# Patient Record
Sex: FEMALE | Race: WHITE
[De-identification: names, ages, dates, MRNs, and addresses within clinical notes are randomized per-mention and may not be internally consistent; named-entity substitution may affect disease eponyms.]

---

## 2017-06-16 PROBLEM — Z00.00 ENCOUNTER FOR PREVENTIVE HEALTH EXAMINATION: Status: ACTIVE | Noted: 2017-06-16

## 2017-07-07 ENCOUNTER — APPOINTMENT (OUTPATIENT)
Dept: VASCULAR SURGERY | Facility: CLINIC | Age: 37
End: 2017-07-07

## 2017-07-21 ENCOUNTER — APPOINTMENT (OUTPATIENT)
Dept: VASCULAR SURGERY | Facility: CLINIC | Age: 37
End: 2017-07-21

## 2017-07-21 VITALS
WEIGHT: 153 LBS | DIASTOLIC BLOOD PRESSURE: 68 MMHG | BODY MASS INDEX: 24.01 KG/M2 | HEIGHT: 67 IN | SYSTOLIC BLOOD PRESSURE: 124 MMHG

## 2017-07-21 DIAGNOSIS — I87.9 DISORDER OF VEIN, UNSPECIFIED: ICD-10-CM

## 2017-07-21 DIAGNOSIS — I78.1 NEVUS, NON-NEOPLASTIC: ICD-10-CM

## 2017-08-08 ENCOUNTER — APPOINTMENT (OUTPATIENT)
Dept: BREAST CENTER | Facility: CLINIC | Age: 37
End: 2017-08-08
Payer: COMMERCIAL

## 2017-08-08 VITALS
BODY MASS INDEX: 24.01 KG/M2 | SYSTOLIC BLOOD PRESSURE: 122 MMHG | DIASTOLIC BLOOD PRESSURE: 78 MMHG | HEIGHT: 67 IN | WEIGHT: 153 LBS

## 2017-08-08 DIAGNOSIS — Z78.9 OTHER SPECIFIED HEALTH STATUS: ICD-10-CM

## 2017-08-08 PROCEDURE — 99212 OFFICE O/P EST SF 10 MIN: CPT

## 2018-08-14 ENCOUNTER — APPOINTMENT (OUTPATIENT)
Dept: BREAST CENTER | Facility: CLINIC | Age: 38
End: 2018-08-14
Payer: COMMERCIAL

## 2018-08-14 VITALS
WEIGHT: 153 LBS | DIASTOLIC BLOOD PRESSURE: 76 MMHG | BODY MASS INDEX: 24.01 KG/M2 | SYSTOLIC BLOOD PRESSURE: 126 MMHG | HEIGHT: 67 IN

## 2018-08-14 PROCEDURE — 99212 OFFICE O/P EST SF 10 MIN: CPT

## 2019-07-23 ENCOUNTER — APPOINTMENT (OUTPATIENT)
Dept: VASCULAR SURGERY | Facility: CLINIC | Age: 39
End: 2019-07-23
Payer: COMMERCIAL

## 2019-07-23 ENCOUNTER — APPOINTMENT (OUTPATIENT)
Dept: BREAST CENTER | Facility: CLINIC | Age: 39
End: 2019-07-23
Payer: COMMERCIAL

## 2019-07-23 PROCEDURE — 93971 EXTREMITY STUDY: CPT

## 2019-07-23 PROCEDURE — 99213 OFFICE O/P EST LOW 20 MIN: CPT

## 2019-07-23 NOTE — HISTORY OF PRESENT ILLNESS
[FreeTextEntry1] : 40 y/o female presents with right leg pain, and swelling for 2 weeks, concerned about DVT.

## 2019-07-23 NOTE — DATA REVIEWED
[FreeTextEntry1] : I performed a venous duplex which was medically necessary to evaluate for DVT. It showed no evidence of DVT in the right lower extremity.\par

## 2019-07-23 NOTE — ASSESSMENT
[FreeTextEntry1] : 38 y/o female presents with right leg pain, and swelling for 2 weeks, concerned about DVT. I performed a venous duplex which was medically necessary to evaluate for DVT. It showed no evidence of DVT in the right lower extremity. She has palpable pedal pulses on exam and no discoloration or wounds. She can follow up as needed.\par No vascular etiology to account for symptoms and advised follow up with PMD for further workup for leg pain.\par

## 2019-08-06 ENCOUNTER — APPOINTMENT (OUTPATIENT)
Dept: BREAST CENTER | Facility: CLINIC | Age: 39
End: 2019-08-06
Payer: COMMERCIAL

## 2019-08-06 VITALS
WEIGHT: 150 LBS | TEMPERATURE: 98.2 F | BODY MASS INDEX: 23.54 KG/M2 | DIASTOLIC BLOOD PRESSURE: 80 MMHG | SYSTOLIC BLOOD PRESSURE: 118 MMHG | HEIGHT: 67 IN

## 2019-08-06 DIAGNOSIS — Z80.3 FAMILY HISTORY OF MALIGNANT NEOPLASM OF BREAST: ICD-10-CM

## 2019-08-06 PROCEDURE — 99212 OFFICE O/P EST SF 10 MIN: CPT

## 2019-08-06 NOTE — ASSESSMENT
[FreeTextEntry1] : 39 year old female with extremely dense breasts.  She has no prior complaints related to the breasts and no history of breast biopsy or breast surgery.  Her family history is significant for her paternal grandmother with breast cancer.  Her Meseret Model risk assessment for breast cancer is 8.8% in her lifetime.  \par \par A bilateral screening mammogram was performed in July.  his demonstrated extremely dense breasts with no significant masses, calcifications or other findings seen in either breast.  Bilateral screening ultrasound was also performed at that time.  There are no suspicious abnormalities seen by ultrasound in either breast.\par \par She is here for evaluation of these findings.  At this time, these findings do not present the need for surgical intervention.  She has a benign clinical breast examination and no current complaints related to the breasts. For now, she will need a bilateral screening mammogram and ultrasound for July 2020, with subsequent follow up clinical breast examination.  I spent a total of 10 minutes of face to face time with this patient, greater than 50% of which was spent in counseling and/or coordination of care.  All of her questions were appropriately answered.\par

## 2019-08-06 NOTE — PAST MEDICAL HISTORY
[Menstruating] : The patient is menstruating [Menarche Age ____] : age at menarche was [unfilled] [Regular Cycle Intervals] : have been regular [Total Preg ___] : G[unfilled] [Live Births ___] : P[unfilled]  [Age At Live Birth ___] : Age at live birth: [unfilled] [FreeTextEntry5] : D&C, myomectomy. [FreeTextEntry7] : none [FreeTextEntry6] : clomid for 6-9 months. [FreeTextEntry8] : none

## 2019-08-06 NOTE — HISTORY OF PRESENT ILLNESS
[FreeTextEntry1] : Patient with extremely dense breasts.\par No prior complaints related to the breasts.\par \par Her family history is significant for her paternal grandmother with breast cancer.  \par Her Meseret Model risk assessment is:\par 0.2 % in five years\par 8.8 % in her lifetime

## 2019-08-06 NOTE — PHYSICAL EXAM
[No Supraclavicular Adenopathy] : no supraclavicular adenopathy [Examined in the supine and seated position] : examined in the supine and seated position [Symmetrical] : symmetrical [No dominant masses] : no dominant masses in right breast  [No dominant masses] : no dominant masses left breast [No Nipple Retraction] : no left nipple retraction [Breast Mass Right Breast ___cm] : no masses [No Nipple Discharge] : no left nipple discharge [Breast Mass Left Breast ___cm] : no masses [Breast Nipple Inversion] : nipples not inverted [Breast Nipple Retraction] : nipples not retracted [Breast Nipple Fissures] : nipples not fissured [Breast Nipple Flattening] : nipples not flattened [Breast Abnormal Lactation (Galactorrhea)] : no galactorrhea [Breast Abnormal Secretion Bloody Fluid] : no bloody discharge [Breast Abnormal Secretion Serous Fluid] : no serous discharge [Breast Abnormal Secretion Opalescent Fluid] : no milky discharge [No Axillary Lymphadenopathy] : no left axillary lymphadenopathy [No Edema] : no edema [Full ROM] : full range of motion [No Swelling] : no swelling [No Rashes] : no rashes [No Ulceration] : no ulceration

## 2019-08-06 NOTE — DATA REVIEWED
[FreeTextEntry1] : Study Date:   15 Jul 2019 08:51 \par Referring Phys.:  ANGEL VELOZ Performing Location:   [facility]  \par EXAM:  MAMMO TOMOSYNTHESIS SCREENING BILATERAL \par OVERALL IMPRESSION: OVERALL BI-RADS 1: NEGATIVE\par There is no mammographic or sonographic evidence of malignancy.\par A 1 year screening mammogram (accession [id number]), A 1 year screening mammogram and ultrasound (accession [id number]) of both breast(s) is recommended. The patient will be sent a normal letter.\par MAMMO TOMOSYNTHESIS SCREENING BILATERAL, US BREAST COMPLETE BILATERAL\par Clinical Breast Exam: Patient does report clinical breast exam in the last year.\par Clinical Indication: Routine screening. Family history of paternal grandmother with breast cancer. (accession [id number]), Clinical Indication: Bilateral screening ultrasound. (accession [id number])\par Compared to: 07/12/2018 and 07/13/2015 (accession [id number]), Compared to: 11/30/2018 US BREAST and 07/13/2015 US BREAST COMPLETE BILATERAL (accession [id number])\par MAMMOGRAM:\par Tomosynthesis and 2D imaging of the breast(s) were performed. Current study was also evaluated with a computer aided detection (CAD) system.\par Breast density: Extremely dense, which lowers the sensitivity of mammography.\par No significant masses, calcifications, or other findings are seen in either breast.\par Mammo BI-RADS 1: NEGATIVE\par \par US BREAST COMPLETE BILATERAL\par Ultrasound evaluation was performed including examination of all four quadrants of the breast(s) and the retroareolar region(s).\par No suspicious abnormalities were seen sonographically in either breast.\par Ultrasound BI-RADS 1: NEGATIVE\par Electronic Signature: I personally reviewed the images and agree with this report. Final Report: Dictated by and Signed by Attending Kelley Balke MD 7/15/2019 5:06 PM\par OVERALL IMPRESSION: OVERALL BI-RADS 1: NEGATIVE\par There is no mammographic or sonographic evidence of malignancy.\par A 1 year screening mammogram (accession [id number]), A 1 year screening mammogram and ultrasound (accession [id number]) of both breast(s) is recommended. The patient will be sent a normal letter.\par

## 2020-08-05 ENCOUNTER — APPOINTMENT (OUTPATIENT)
Dept: BREAST CENTER | Facility: CLINIC | Age: 40
End: 2020-08-05
Payer: COMMERCIAL

## 2020-08-05 VITALS
WEIGHT: 150 LBS | BODY MASS INDEX: 23.54 KG/M2 | SYSTOLIC BLOOD PRESSURE: 116 MMHG | TEMPERATURE: 97.8 F | DIASTOLIC BLOOD PRESSURE: 74 MMHG | HEIGHT: 67 IN

## 2020-08-05 PROCEDURE — 99213 OFFICE O/P EST LOW 20 MIN: CPT

## 2020-08-05 NOTE — ASSESSMENT
[FreeTextEntry1] : ZOILA is a boogie 40 year old patient who presented today in follow up for a history of fibrocystic breast changes\par She has been doing well with no new breast related complaints. \par Imaging was done recently which revealed no mammographic or sonographic evidence of malignancy, as detailed above. \par Physical exam was unrevealing today.\par \par Imaging with a bilateral screening mammogram and sonogram will be due in July 2021, and that will be scheduled today. \par She will return for follow-up and clinical breast exam in one year.\par \par I spent a total of 15 minutes of face to face time with this patient, greater than 50% of which was spent in counseling and/or coordination of care.\par All of her questions were appropriately answered.\par She knows to call with any concerns.\par \par \par

## 2020-08-05 NOTE — HISTORY OF PRESENT ILLNESS
[FreeTextEntry1] : Patient with FHx breast cancer - paternal grandmother, age unknown.\par \par Her Meseret Model risk assessment: \par 0.2 % in five years \par 8.8 % in her lifetime\par \par ZOILA ESTRADA is a 40 year old female patient who presents today in follow up for history of fibrocystic breast changes.\par Since her last visit, she has no new breast related complaints. \par Imaging was done on 07/22/2020, which revealed no mammographic or sonographic evidence of malignancy.\par \par She presents today for evaluation and imaging review.

## 2020-08-05 NOTE — DATA REVIEWED
[FreeTextEntry1] : Patient Name:  ZOILA ESTRADA	Patient ID:  [id number]\par Patient :   1980	Gender:   Female\par Accession Number:   [id number]	Study Date:   2020 11:10\par Referring Phys.:  ROLO MAE	Performing Location:   [facility]\par EXAM:  MAMMO TOMOSYNTHESIS SCREENING BILATERAL\par \par \par OVERALL IMPRESSION: OVERALL BI-RADS 2: BENIGN\par \par There is no mammographic or sonographic evidence of malignancy.\par \par A 1 year screening mammogram and ultrasound of both breast(s) is recommended. The patient will be sent a normal letter.\par \par MAMMO TOMOSYNTHESIS SCREENING BILATERAL, US BREAST COMPLETE BILATERAL\par \par Clinical Breast Exam: Patient does report clinical breast exam in the last year.\par \par Clinical Indication: Routine screening. Family history of paternal grandmother with breast cancer. (accession [id number]), Clinical Indication: Bilateral screening ultrasound. (accession [id number])\par \par Compared to: 07/15/2019, 2018, and 2015 (accession [id number]), Compared to: 07/15/2019 US BREAST, 2018 US BREAST, and 2015 US BREAST COMPLETE BILATERAL (accession [id number])\par \par MAMMOGRAM:\par \par Tomosynthesis and 2D imaging of the breast(s) were performed. Current study was also evaluated with a computer aided detection (CAD) system.\par \par Breast density: Extremely dense, which lowers the sensitivity of mammography.\par \par No significant masses, calcifications, or other findings are seen in either breast.\par \par Mammo BI-RADS 1: NEGATIVE\par \par \par US BREAST COMPLETE BILATERAL\par \par Ultrasound evaluation was performed including examination of all four quadrants of the breast(s) and the retroareolar region(s).\par \par There are scattered bilateral benign-appearing cysts. No suspicious abnormalities were seen sonographically in either breast.\par \par Ultrasound BI-RADS 2: BENIGN\par \par \par Electronic Signature: I personally reviewed the images and agree with this report. Final Report: Dictated by and Signed by Attending Kelley Blake MD 2020 11:48 AM\par

## 2020-08-05 NOTE — PHYSICAL EXAM
[Normocephalic] : normocephalic [Atraumatic] : atraumatic [No dominant masses] : no dominant masses in right breast  [No dominant masses] : no dominant masses left breast [No Nipple Discharge] : no left nipple discharge [No Rashes] : no rashes [No Ulceration] : no ulceration [Breast Nipple Inversion] : nipples not inverted [Breast Nipple Retraction] : nipples not retracted [de-identified] : B/L nodular dense breast parenchyma. [de-identified] : No axillary lymphadenopathy appreciated. [de-identified] : No axillary lymphadenopathy appreciated.

## 2020-12-01 DIAGNOSIS — N64.4 MASTODYNIA: ICD-10-CM

## 2021-03-17 ENCOUNTER — OUTPATIENT (OUTPATIENT)
Dept: OUTPATIENT SERVICES | Facility: HOSPITAL | Age: 41
LOS: 1 days | Discharge: HOME | End: 2021-03-17
Payer: COMMERCIAL

## 2021-03-17 VITALS
DIASTOLIC BLOOD PRESSURE: 63 MMHG | SYSTOLIC BLOOD PRESSURE: 112 MMHG | HEART RATE: 71 BPM | OXYGEN SATURATION: 99 % | RESPIRATION RATE: 18 BRPM | TEMPERATURE: 97 F | HEIGHT: 67 IN | WEIGHT: 151.9 LBS

## 2021-03-17 DIAGNOSIS — R39.89 OTHER SYMPTOMS AND SIGNS INVOLVING THE GENITOURINARY SYSTEM: ICD-10-CM

## 2021-03-17 DIAGNOSIS — Z01.818 ENCOUNTER FOR OTHER PREPROCEDURAL EXAMINATION: ICD-10-CM

## 2021-03-17 DIAGNOSIS — Z98.890 OTHER SPECIFIED POSTPROCEDURAL STATES: Chronic | ICD-10-CM

## 2021-03-17 LAB
A1C WITH ESTIMATED AVERAGE GLUCOSE RESULT: 5.5 % — SIGNIFICANT CHANGE UP (ref 4–5.6)
ALBUMIN SERPL ELPH-MCNC: 4.5 G/DL — SIGNIFICANT CHANGE UP (ref 3.5–5.2)
ALP SERPL-CCNC: 56 U/L — SIGNIFICANT CHANGE UP (ref 30–115)
ALT FLD-CCNC: 12 U/L — SIGNIFICANT CHANGE UP (ref 0–41)
ANION GAP SERPL CALC-SCNC: 10 MMOL/L — SIGNIFICANT CHANGE UP (ref 7–14)
APPEARANCE UR: CLEAR — SIGNIFICANT CHANGE UP
APTT BLD: 30.1 SEC — SIGNIFICANT CHANGE UP (ref 27–39.2)
AST SERPL-CCNC: 17 U/L — SIGNIFICANT CHANGE UP (ref 0–41)
BACTERIA # UR AUTO: NEGATIVE — SIGNIFICANT CHANGE UP
BASOPHILS # BLD AUTO: 0.03 K/UL — SIGNIFICANT CHANGE UP (ref 0–0.2)
BASOPHILS NFR BLD AUTO: 0.4 % — SIGNIFICANT CHANGE UP (ref 0–1)
BILIRUB SERPL-MCNC: <0.2 MG/DL — SIGNIFICANT CHANGE UP (ref 0.2–1.2)
BILIRUB UR-MCNC: NEGATIVE — SIGNIFICANT CHANGE UP
BLD GP AB SCN SERPL QL: SIGNIFICANT CHANGE UP
BUN SERPL-MCNC: 18 MG/DL — SIGNIFICANT CHANGE UP (ref 10–20)
CALCIUM SERPL-MCNC: 9.6 MG/DL — SIGNIFICANT CHANGE UP (ref 8.5–10.1)
CHLORIDE SERPL-SCNC: 101 MMOL/L — SIGNIFICANT CHANGE UP (ref 98–110)
CO2 SERPL-SCNC: 24 MMOL/L — SIGNIFICANT CHANGE UP (ref 17–32)
COLOR SPEC: YELLOW — SIGNIFICANT CHANGE UP
CREAT SERPL-MCNC: 0.7 MG/DL — SIGNIFICANT CHANGE UP (ref 0.7–1.5)
DIFF PNL FLD: SIGNIFICANT CHANGE UP
EOSINOPHIL # BLD AUTO: 0.06 K/UL — SIGNIFICANT CHANGE UP (ref 0–0.7)
EOSINOPHIL NFR BLD AUTO: 0.9 % — SIGNIFICANT CHANGE UP (ref 0–8)
EPI CELLS # UR: 1 /HPF — SIGNIFICANT CHANGE UP (ref 0–5)
ESTIMATED AVERAGE GLUCOSE: 111 MG/DL — SIGNIFICANT CHANGE UP (ref 68–114)
GLUCOSE SERPL-MCNC: 91 MG/DL — SIGNIFICANT CHANGE UP (ref 70–99)
GLUCOSE UR QL: NEGATIVE — SIGNIFICANT CHANGE UP
HCT VFR BLD CALC: 38.8 % — SIGNIFICANT CHANGE UP (ref 37–47)
HGB BLD-MCNC: 13.1 G/DL — SIGNIFICANT CHANGE UP (ref 12–16)
HYALINE CASTS # UR AUTO: 1 /LPF — SIGNIFICANT CHANGE UP (ref 0–7)
IMM GRANULOCYTES NFR BLD AUTO: 0.3 % — SIGNIFICANT CHANGE UP (ref 0.1–0.3)
INR BLD: 1.06 RATIO — SIGNIFICANT CHANGE UP (ref 0.65–1.3)
KETONES UR-MCNC: NEGATIVE — SIGNIFICANT CHANGE UP
LEUKOCYTE ESTERASE UR-ACNC: NEGATIVE — SIGNIFICANT CHANGE UP
LYMPHOCYTES # BLD AUTO: 1.84 K/UL — SIGNIFICANT CHANGE UP (ref 1.2–3.4)
LYMPHOCYTES # BLD AUTO: 26.8 % — SIGNIFICANT CHANGE UP (ref 20.5–51.1)
MCHC RBC-ENTMCNC: 29.8 PG — SIGNIFICANT CHANGE UP (ref 27–31)
MCHC RBC-ENTMCNC: 33.8 G/DL — SIGNIFICANT CHANGE UP (ref 32–37)
MCV RBC AUTO: 88.2 FL — SIGNIFICANT CHANGE UP (ref 81–99)
MONOCYTES # BLD AUTO: 0.44 K/UL — SIGNIFICANT CHANGE UP (ref 0.1–0.6)
MONOCYTES NFR BLD AUTO: 6.4 % — SIGNIFICANT CHANGE UP (ref 1.7–9.3)
NEUTROPHILS # BLD AUTO: 4.47 K/UL — SIGNIFICANT CHANGE UP (ref 1.4–6.5)
NEUTROPHILS NFR BLD AUTO: 65.2 % — SIGNIFICANT CHANGE UP (ref 42.2–75.2)
NITRITE UR-MCNC: NEGATIVE — SIGNIFICANT CHANGE UP
NRBC # BLD: 0 /100 WBCS — SIGNIFICANT CHANGE UP (ref 0–0)
PH UR: 6.5 — SIGNIFICANT CHANGE UP (ref 5–8)
PLATELET # BLD AUTO: 236 K/UL — SIGNIFICANT CHANGE UP (ref 130–400)
POTASSIUM SERPL-MCNC: 4.4 MMOL/L — SIGNIFICANT CHANGE UP (ref 3.5–5)
POTASSIUM SERPL-SCNC: 4.4 MMOL/L — SIGNIFICANT CHANGE UP (ref 3.5–5)
PROT SERPL-MCNC: 7.3 G/DL — SIGNIFICANT CHANGE UP (ref 6–8)
PROT UR-MCNC: ABNORMAL
PROTHROM AB SERPL-ACNC: 12.2 SEC — SIGNIFICANT CHANGE UP (ref 9.95–12.87)
RBC # BLD: 4.4 M/UL — SIGNIFICANT CHANGE UP (ref 4.2–5.4)
RBC # FLD: 12 % — SIGNIFICANT CHANGE UP (ref 11.5–14.5)
RBC CASTS # UR COMP ASSIST: 6 /HPF — HIGH (ref 0–4)
SODIUM SERPL-SCNC: 135 MMOL/L — SIGNIFICANT CHANGE UP (ref 135–146)
SP GR SPEC: 1.04 — HIGH (ref 1.01–1.03)
UROBILINOGEN FLD QL: SIGNIFICANT CHANGE UP
WBC # BLD: 6.86 K/UL — SIGNIFICANT CHANGE UP (ref 4.8–10.8)
WBC # FLD AUTO: 6.86 K/UL — SIGNIFICANT CHANGE UP (ref 4.8–10.8)
WBC UR QL: 1 /HPF — SIGNIFICANT CHANGE UP (ref 0–5)

## 2021-03-17 PROCEDURE — 93010 ELECTROCARDIOGRAM REPORT: CPT

## 2021-03-17 RX ORDER — CHOLECALCIFEROL (VITAMIN D3) 125 MCG
0 CAPSULE ORAL
Qty: 0 | Refills: 0 | DISCHARGE

## 2021-03-17 NOTE — H&P PST ADULT - HISTORY OF PRESENT ILLNESS
CURRENTLY  DENIES ANY CP, SOB,PALPITATIONS,COUGH OR DYSURIA  EXERCISE TOLERANCE 3 FOS WITHOUT SOB    AS PER PATIENT  this is his/her complete medical history including medications - PRESCRIPTIONS  OVER THE COUNTER MEDS    pt denies any covid s/s, or tested positive in the past  pt advised self quarantine till day of procedure    Anesthesia Alert  NO--Difficult Airway  NO--History of neck surgery or radiation  NO--Limited ROM of neck  NO--History of Malignant hyperthermia  NO--No personal or family history of Pseudocholinesterase deficiency.  NO--Prior Anesthesia Complication  NO--Latex Allergy  NO--Loose teeth  NO--History of Rheumatoid Arthritis  NO--MALENA  NO--Other_____

## 2021-03-17 NOTE — H&P PST ADULT - NSANTHOSAYNRD_GEN_A_CORE
No. MALENA screening performed.  STOP BANG Legend: 0-2 = LOW Risk; 3-4 = INTERMEDIATE Risk; 5-8 = HIGH Risk

## 2021-03-17 NOTE — H&P PST ADULT - REASON FOR ADMISSION
41 Y/O F SCHEDULED FOR PAST FOR ROBOTIC LAPAROSCOPY, MYOMECTOMY, LYSIS OF ADHESIONS, BILATERAL SALPINGECTOMY, POSSIBLE LAPAROTOMY, POSSIBLE HYSTERECTOMY IN EMERGENCY ON 4/7/21- PT REPORTS "SCARE" LAST YEAR WHEN CYST WAS FOUND LEFT OVARY. AFTER FURTHER TESTING WAS FOUND TO HAVE FIBROID AND THE CYST HAS SINCE SHRUNK IN SIZE. HAD MYOMECTOMY 8 YRS AGO. HAVING HEAVY MENSES.

## 2021-04-16 ENCOUNTER — OUTPATIENT (OUTPATIENT)
Dept: OUTPATIENT SERVICES | Facility: HOSPITAL | Age: 41
LOS: 1 days | Discharge: HOME | End: 2021-04-16

## 2021-04-16 VITALS
OXYGEN SATURATION: 98 % | HEART RATE: 74 BPM | DIASTOLIC BLOOD PRESSURE: 67 MMHG | SYSTOLIC BLOOD PRESSURE: 118 MMHG | TEMPERATURE: 97 F | RESPIRATION RATE: 16 BRPM | HEIGHT: 67 IN | WEIGHT: 156.53 LBS

## 2021-04-16 DIAGNOSIS — R39.89 OTHER SYMPTOMS AND SIGNS INVOLVING THE GENITOURINARY SYSTEM: ICD-10-CM

## 2021-04-16 DIAGNOSIS — Z98.890 OTHER SPECIFIED POSTPROCEDURAL STATES: Chronic | ICD-10-CM

## 2021-04-16 DIAGNOSIS — Z01.818 ENCOUNTER FOR OTHER PREPROCEDURAL EXAMINATION: ICD-10-CM

## 2021-04-16 NOTE — H&P PST ADULT - ATTENDING COMMENTS
repeat laparoscopy, myomectomy, lysis of adhesions possible elap. h.o carmen early april.4/5/21 asymptomatic and afebril. confirmed case and no issue per hospital policy

## 2021-04-16 NOTE — H&P PST ADULT - NSICDXFAMILYHX_GEN_ALL_CORE_FT
FAMILY HISTORY:  No pertinent family history in first degree relatives     FAMILY HISTORY:  Grandparent  Still living? No  FH: CAD (coronary artery disease), Age at diagnosis: 51-60  FH: type 2 diabetes, Age at diagnosis: 61-70

## 2021-04-16 NOTE — H&P PST ADULT - NSICDXPASTSURGICALHX_GEN_ALL_CORE_FT
PAST SURGICAL HISTORY:  H/O myomectomy      PAST SURGICAL HISTORY:  H/O myomectomy     History of colonoscopy

## 2021-04-16 NOTE — H&P PST ADULT - REASON FOR ADMISSION
robotic laparoscopy myomectomy, lysis of adhesion, bilateral salpingectomy, possible laparotomy, possible hysterectomy in emergency

## 2021-04-16 NOTE — H&P PST ADULT - NS PRO TALK SOMEONE YN
Detail Level: Generalized
Quality 130: Documentation Of Current Medications In The Medical Record: Eligible clinician attests to documenting in the medical record the patient is not eligible for a current list of medications being obtained, updated, or reviewed by the eligible clinician
no

## 2021-04-16 NOTE — H&P PST ADULT - NSICDXPASTMEDICALHX_GEN_ALL_CORE_FT
PAST MEDICAL HISTORY:  No pertinent past medical history      PAST MEDICAL HISTORY:  2019 novel coronavirus disease (COVID-19) 4/5/21, currently asymptomatic    Fibroid uterus

## 2021-04-16 NOTE — H&P PST ADULT - HISTORY OF PRESENT ILLNESS
40 year old female here for above    Anesthesia Alert  NO--Difficult Airway  NO--History of neck surgery or radiation  NO--Limited ROM of neck  NO--History of Malignant hyperthermia  NO--Personal or family history of Pseudocholinesterase deficiency  NO--Prior Anesthesia Complication  NO--Latex Allergy  NO--Loose teeth  NO--History of Rheumatoid Arthritis  NO--MALENA    Pt denies any s/s covid 19 and reports no contact with known positive people. Pt has appointment for repeat covid testing pre op and instructed to continue to self monitor and report any concerns to MD. Pt will continue to practice self isolation and  exposure control measures pre op  + hx of COIVD on , previously scheduled for surgery on        40 year old female here for above, pt was previously scheduled for procedure on 4/8, had + COVID, only symptom was severe headache, sinusitis, treated with amoxicillin. symptoms resolved quickly, afebrile, no cough, for >10 days.  pt states + uterine fibroid approx 8 years ago, same has recurred causing pain, and some feeling of 'fullness', pt had multiple consults with [facility], including [facility] [place], pt states needs procedure  pt denies urinary frequency, urgency or burning  ex kurt 2-3 fos    Anesthesia Alert  NO--Difficult Airway  NO--History of neck surgery or radiation  NO--Limited ROM of neck  NO--History of Malignant hyperthermia  NO--Personal or family history of Pseudocholinesterase deficiency  NO--Prior Anesthesia Complication  NO--Latex Allergy  NO--Loose teeth  NO--History of Rheumatoid Arthritis  NO--MALENA  Pt denies any s/s covid 19 and reports no contact with known positive people. Pt has appointment for repeat covid testing pre op and instructed to continue to self monitor and report any concerns to MD. Pt will continue to practice self isolation and  exposure control measures pre op  + hx of COVID on 4/5/21 , previously scheduled for surgery on 4/7, pt currently asymptomatic and has been for more than 10 days 40 year old female here for above, pt was previously scheduled for procedure on 4/8, had + COVID, only symptom was severe headache, sinusitis, treated with amoxicillin. symptoms resolved quickly, afebrile, no cough, for >10 days.  pt states + uterine fibroid approx 8 years ago, same has recurred causing pain, and some feeling of 'fullness', has 2 smaller fibroids,  pt had multiple consults with GYNs, including [facility] [place], pt states needs procedure  pt denies urinary frequency, urgency or burning  ex kurt 2-3 fos    Anesthesia Alert  NO--Difficult Airway  NO--History of neck surgery or radiation  NO--Limited ROM of neck  NO--History of Malignant hyperthermia  NO--Personal or family history of Pseudocholinesterase deficiency  NO--Prior Anesthesia Complication  NO--Latex Allergy  NO--Loose teeth  NO--History of Rheumatoid Arthritis  NO--MALENA  Pt denies any s/s covid 19 and reports no contact with known positive people. Pt has appointment for repeat covid testing pre op and instructed to continue to self monitor and report any concerns to MD. Pt will continue to practice self isolation and  exposure control measures pre op  + hx of COVID on 4/5/21 , previously scheduled for surgery on 4/7, pt currently asymptomatic and has been for more than 10 days

## 2021-05-02 ENCOUNTER — OUTPATIENT (OUTPATIENT)
Dept: OUTPATIENT SERVICES | Facility: HOSPITAL | Age: 41
LOS: 1 days | Discharge: HOME | End: 2021-05-02

## 2021-05-02 DIAGNOSIS — Z98.890 OTHER SPECIFIED POSTPROCEDURAL STATES: Chronic | ICD-10-CM

## 2021-05-02 DIAGNOSIS — Z11.59 ENCOUNTER FOR SCREENING FOR OTHER VIRAL DISEASES: ICD-10-CM

## 2021-05-02 PROBLEM — D25.9 LEIOMYOMA OF UTERUS, UNSPECIFIED: Chronic | Status: ACTIVE | Noted: 2021-04-16

## 2021-05-02 PROBLEM — U07.1 COVID-19: Chronic | Status: ACTIVE | Noted: 2021-04-16

## 2021-05-05 ENCOUNTER — RESULT REVIEW (OUTPATIENT)
Age: 41
End: 2021-05-05

## 2021-05-05 ENCOUNTER — OUTPATIENT (OUTPATIENT)
Dept: OUTPATIENT SERVICES | Facility: HOSPITAL | Age: 41
LOS: 1 days | Discharge: HOME | End: 2021-05-05
Payer: COMMERCIAL

## 2021-05-05 VITALS
RESPIRATION RATE: 14 BRPM | HEART RATE: 82 BPM | SYSTOLIC BLOOD PRESSURE: 114 MMHG | DIASTOLIC BLOOD PRESSURE: 63 MMHG | OXYGEN SATURATION: 100 %

## 2021-05-05 VITALS
SYSTOLIC BLOOD PRESSURE: 128 MMHG | OXYGEN SATURATION: 100 % | TEMPERATURE: 98 F | HEART RATE: 88 BPM | RESPIRATION RATE: 16 BRPM | HEIGHT: 67 IN | DIASTOLIC BLOOD PRESSURE: 70 MMHG | WEIGHT: 153 LBS

## 2021-05-05 DIAGNOSIS — Z98.890 OTHER SPECIFIED POSTPROCEDURAL STATES: Chronic | ICD-10-CM

## 2021-05-05 LAB — BLD GP AB SCN SERPL QL: SIGNIFICANT CHANGE UP

## 2021-05-05 PROCEDURE — 88305 TISSUE EXAM BY PATHOLOGIST: CPT | Mod: 26

## 2021-05-05 PROCEDURE — 88302 TISSUE EXAM BY PATHOLOGIST: CPT | Mod: 26

## 2021-05-05 RX ORDER — ASCORBIC ACID 60 MG
1 TABLET,CHEWABLE ORAL
Qty: 0 | Refills: 0 | DISCHARGE

## 2021-05-05 RX ORDER — SIMETHICONE 80 MG/1
1 TABLET, CHEWABLE ORAL
Qty: 21 | Refills: 0
Start: 2021-05-05 | End: 2021-05-11

## 2021-05-05 RX ORDER — ONDANSETRON 8 MG/1
4 TABLET, FILM COATED ORAL ONCE
Refills: 0 | Status: DISCONTINUED | OUTPATIENT
Start: 2021-05-05 | End: 2021-05-19

## 2021-05-05 RX ORDER — HYDROMORPHONE HYDROCHLORIDE 2 MG/ML
0.5 INJECTION INTRAMUSCULAR; INTRAVENOUS; SUBCUTANEOUS
Refills: 0 | Status: DISCONTINUED | OUTPATIENT
Start: 2021-05-05 | End: 2021-05-05

## 2021-05-05 RX ORDER — DOCUSATE SODIUM 100 MG
1 CAPSULE ORAL
Qty: 14 | Refills: 0
Start: 2021-05-05 | End: 2021-05-11

## 2021-05-05 RX ORDER — OXYCODONE HYDROCHLORIDE 5 MG/1
1 TABLET ORAL
Qty: 7 | Refills: 0
Start: 2021-05-05

## 2021-05-05 RX ORDER — CHOLECALCIFEROL (VITAMIN D3) 125 MCG
0 CAPSULE ORAL
Qty: 0 | Refills: 0 | DISCHARGE

## 2021-05-05 RX ORDER — SODIUM CHLORIDE 9 MG/ML
1000 INJECTION, SOLUTION INTRAVENOUS
Refills: 0 | Status: DISCONTINUED | OUTPATIENT
Start: 2021-05-05 | End: 2021-05-19

## 2021-05-05 RX ORDER — GABAPENTIN 400 MG/1
1 CAPSULE ORAL
Qty: 9 | Refills: 0
Start: 2021-05-05 | End: 2021-05-07

## 2021-05-05 RX ORDER — ACETAMINOPHEN 500 MG
3 TABLET ORAL
Qty: 84 | Refills: 0
Start: 2021-05-05 | End: 2021-05-11

## 2021-05-05 RX ORDER — HYDROMORPHONE HYDROCHLORIDE 2 MG/ML
1 INJECTION INTRAMUSCULAR; INTRAVENOUS; SUBCUTANEOUS
Refills: 0 | Status: DISCONTINUED | OUTPATIENT
Start: 2021-05-05 | End: 2021-05-05

## 2021-05-05 RX ORDER — IBUPROFEN 200 MG
1 TABLET ORAL
Qty: 28 | Refills: 0
Start: 2021-05-05 | End: 2021-05-11

## 2021-05-05 RX ORDER — OXYCODONE AND ACETAMINOPHEN 5; 325 MG/1; MG/1
1 TABLET ORAL ONCE
Refills: 0 | Status: DISCONTINUED | OUTPATIENT
Start: 2021-05-05 | End: 2021-05-05

## 2021-05-05 RX ADMIN — SODIUM CHLORIDE 100 MILLILITER(S): 9 INJECTION, SOLUTION INTRAVENOUS at 14:52

## 2021-05-05 RX ADMIN — HYDROMORPHONE HYDROCHLORIDE 0.5 MILLIGRAM(S): 2 INJECTION INTRAMUSCULAR; INTRAVENOUS; SUBCUTANEOUS at 16:06

## 2021-05-05 NOTE — BRIEF OPERATIVE NOTE - NSICDXBRIEFPROCEDURE_GEN_ALL_CORE_FT
PROCEDURES:  BX peritoneum 05-May-2021 14:31:26  Domingo Jorgensen  Laparoscopic myomectomy 05-May-2021 14:31:30  Domingo Jorgensen  Laparoscopic salpingectomy 05-May-2021 14:31:43  Domingo Jorgensen

## 2021-05-05 NOTE — ASU DISCHARGE PLAN (ADULT/PEDIATRIC) - ACTIVITY LEVEL
for at least 2 weeks/No heavy lifting/Nothing per vagina/No tub baths/No douching/No tampons/No intercourse

## 2021-05-05 NOTE — PRE-ANESTHESIA EVALUATION ADULT - NSPROPOSEDPROCEDFT_GEN_ALL_CORE
robotic laparoscopy, myomectomy, SWATHI, b/l salpingectomy, possible laparotomy, possible hysterectomy

## 2021-05-05 NOTE — ASU DISCHARGE PLAN (ADULT/PEDIATRIC) - CARE PROVIDER_API CALL
Domingo Jorgensen  [facility]  [address] [place] [address]  Phone: [phone]  Fax: [phone]  Established Patient  Follow Up Time: 2 weeks

## 2021-05-05 NOTE — CHART NOTE - NSCHARTNOTEFT_GEN_A_CORE
PACU ANESTHESIA ADMISSION NOTE      ____ Intubated  TV:______       Rate: ______      FiO2: ______    _x___ Patent Airway    _x___ Full return of protective reflexes    ____ Full recovery from anesthesia / sedation to baseline status    Vitals:  HR 54  /56  RR 13  O2sat. 100%  Temp: 36.5C      Mental Status:  __x__ Awake   _____ Alert   __x___ Drowsy   _____ Sedated    Nausea/Vomiting: ____ Yes, See Post - Op Orders      __x__ No    Pain Scale (0-10): _____    Treatment: __x__ None    ____ See Post - Op/PCA Orders    Post - Operative Fluids:   ____ Oral   __x__ See Post - Op Orders    Plan:  Discharge to:   __x__Home       _____Floor      _____Critical Care    _____ Other:_________________    Comments: s/p general anesthesia with ETT. No anesthesia complications. Pt's condition is stable in PACU. Full report is given to PACU RN.

## 2021-05-05 NOTE — ASU PATIENT PROFILE, ADULT - PRO INTERPRETER NEED 2
Problem: Pain:  Goal: Control of acute pain  Description: Control of acute pain  11/1/2020 1816 by Salma Nino RN  Outcome: Met This Shift     Problem: Skin Integrity:  Goal: Will show no infection signs and symptoms  Description: Will show no infection signs and symptoms  11/2/2020 0602 by Ryder Gibbs RN  Outcome: Met This Shift  11/1/2020 2208 by Ryder Gibbs RN  Outcome: Met This Shift  11/1/2020 1816 by Salma Nino RN  Outcome: Met This Shift  Goal: Absence of new skin breakdown  Description: Absence of new skin breakdown  11/2/2020 0602 by Ryder Gibbs RN  Outcome: Met This Shift  11/1/2020 2208 by Ryder Gibbs RN  Outcome: Met This Shift  11/1/2020 1816 by Salma Nino RN  Outcome: Met This Shift     Problem: Falls - Risk of:  Goal: Will remain free from falls  Description: Will remain free from falls  11/2/2020 0602 by Ryder Gibbs RN  Outcome: Met This Shift  11/1/2020 2208 by Ryder Gibbs RN  Outcome: Met This Shift  11/1/2020 1816 by Salma Nino RN  Outcome: Met This Shift  Goal: Absence of physical injury  Description: Absence of physical injury  11/2/2020 0602 by Ryder Gibbs RN  Outcome: Met This Shift  11/1/2020 2208 by Ryder Gibbs RN  Outcome: Met This Shift  11/1/2020 1816 by Salma Nino RN  Outcome: Met This Shift English

## 2021-05-07 LAB — SURGICAL PATHOLOGY STUDY: SIGNIFICANT CHANGE UP

## 2021-05-14 DIAGNOSIS — N73.6 FEMALE PELVIC PERITONEAL ADHESIONS (POSTINFECTIVE): ICD-10-CM

## 2021-05-14 DIAGNOSIS — D25.2 SUBSEROSAL LEIOMYOMA OF UTERUS: ICD-10-CM

## 2021-05-14 DIAGNOSIS — Z20.818 CONTACT WITH AND (SUSPECTED) EXPOSURE TO OTHER BACTERIAL COMMUNICABLE DISEASES: ICD-10-CM

## 2021-05-14 DIAGNOSIS — N80.0 ENDOMETRIOSIS OF UTERUS: ICD-10-CM

## 2021-05-14 DIAGNOSIS — D25.1 INTRAMURAL LEIOMYOMA OF UTERUS: ICD-10-CM

## 2021-07-29 ENCOUNTER — NON-APPOINTMENT (OUTPATIENT)
Age: 41
End: 2021-07-29

## 2021-08-10 ENCOUNTER — APPOINTMENT (OUTPATIENT)
Dept: BREAST CENTER | Facility: CLINIC | Age: 41
End: 2021-08-10
Payer: COMMERCIAL

## 2021-08-10 VITALS
TEMPERATURE: 98.3 F | SYSTOLIC BLOOD PRESSURE: 122 MMHG | BODY MASS INDEX: 23.54 KG/M2 | HEIGHT: 67 IN | WEIGHT: 150 LBS | DIASTOLIC BLOOD PRESSURE: 70 MMHG

## 2021-08-10 PROCEDURE — 99212 OFFICE O/P EST SF 10 MIN: CPT

## 2021-08-10 NOTE — PHYSICAL EXAM
[Normocephalic] : normocephalic [EOMI] : extra ocular movement intact [Examined in the supine and seated position] : examined in the supine and seated position [Symmetrical] : symmetrical [No dominant masses] : no dominant masses in right breast  [No dominant masses] : no dominant masses left breast [No Nipple Retraction] : no left nipple retraction [No Nipple Discharge] : no left nipple discharge [No Axillary Lymphadenopathy] : no left axillary lymphadenopathy [No Edema] : no edema [No Rashes] : no rashes [No Ulceration] : no ulceration

## 2021-08-10 NOTE — DATA REVIEWED
[FreeTextEntry1] : 07/26/21\par \par OVERALL IMPRESSION: OVERALL BI-RADS 2: BENIGN\par \par There is no mammographic or sonographic evidence of malignancy.\par \par A 1 year screening mammogram and ultrasound of both breast(s) is recommended. The patient will be sent a normal letter.\par \par MAMMO TOMOSYNTHESIS SCREENING BILATERAL, US BREAST COMPLETE BILATERAL\par \par Clinical Breast Exam: Patient does report clinical breast exam in the last year.\par \par Clinical Indication: Routine screening. Family history of paternal grandmother with breast cancer. (accession [id number]), Clinical Indication: Bilateral screening ultrasound. (accession [id number])\par \par Compared to: 07/15/2019, 07/12/2018, and 07/13/2015 (accession [id number]), Compared to: 07/15/2019 US BREAST, 11/30/2018 US BREAST, and 07/13/2015 US BREAST COMPLETE BILATERAL (accession [id number])\par \par MAMMOGRAM:\par \par Tomosynthesis and 2D imaging of the breast(s) were performed. Current study was also evaluated with a computer aided detection (CAD) system.\par \par Breast density: Heterogeneously dense, which may obscure small masses.\par \par No significant masses, calcifications, or other findings are seen in either breast.\par \par Mammo BI-RADS 1: NEGATIVE\par \par \par US BREAST COMPLETE BILATERAL\par \par Ultrasound evaluation was performed including examination of all four quadrants of the breast(s) and the retroareolar region(s).\par \par There are scattered bilateral subcentimeter anechoic cysts. No suspicious abnormalities were seen sonographically in either breast.\par \par Ultrasound BI-RADS 2: BENIGN\par \par \par Electronic Signature: I personally reviewed the images and agree with this report. Final Report: Dictated by and Signed by Attending Kelley Blake MD 7/27/21 9:27 AM\par

## 2021-08-10 NOTE — ASSESSMENT
[FreeTextEntry1] : ZOILA is a boogie 41 year old patient who presented today in follow up for a history of fibrocystic breast changes\par She has been doing well with no new breast related complaints. \par Imaging was done recently which revealed no mammographic or sonographic evidence of malignancy, as detailed above. \par Physical exam was unrevealing today.\par \par Imaging with a bilateral screening mammogram and sonogram will be due in July 2022, and that will be scheduled today. \par She will return for follow-up and clinical breast exam in one year.\par \par I spent a total of 15 minutes of face to face time with this patient, greater than 50% of which was spent in counseling and/or coordination of care.\par All of her questions were appropriately answered.\par She knows to call with any concerns.\par \par \par

## 2021-08-10 NOTE — HISTORY OF PRESENT ILLNESS
[FreeTextEntry1] : Patient with FHx breast cancer - paternal grandmother, age unknown.\par \par Her Meseret Model risk assessment: \par 0.2 % in five years \par 8.8 % in her lifetime\par \par ZOILA ESTRADA is a 41 year old female patient who presents today in follow up for history of fibrocystic breast changes.\par Since her last visit, she has no new breast related complaints. \par Imaging was done on 07/26/21, which revealed no mammographic or sonographic evidence of malignancy.\par \par She presents today for evaluation and imaging review.

## 2022-05-25 ENCOUNTER — APPOINTMENT (OUTPATIENT)
Dept: VASCULAR SURGERY | Facility: CLINIC | Age: 42
End: 2022-05-25
Payer: COMMERCIAL

## 2022-05-25 VITALS — SYSTOLIC BLOOD PRESSURE: 140 MMHG | WEIGHT: 157 LBS | DIASTOLIC BLOOD PRESSURE: 80 MMHG | BODY MASS INDEX: 24.59 KG/M2

## 2022-05-25 PROCEDURE — 99213 OFFICE O/P EST LOW 20 MIN: CPT

## 2022-05-25 PROCEDURE — 93971 EXTREMITY STUDY: CPT

## 2022-05-25 NOTE — ASSESSMENT
[FreeTextEntry1] : The patient is a 42 yo female who has a history of left leg symptomatic varicose veins. the patient had a venous duplex performed today my office that showed left greater saphenous vein incompetency. I have discussed the risk benefits of endovenous laser ablation with the patient. She will try a course of conservative therapy with compression stocking therapy 20-30 mmHg compression. I will see the patient back in my office in 3 months time or sooner if any new symptoms develop.

## 2022-05-25 NOTE — HISTORY OF PRESENT ILLNESS
[FreeTextEntry1] : The patient is a 42 yo female who has left leg pain and tenderness over the veins in her left calf. She also complains of leg swelling.

## 2022-05-25 NOTE — DATA REVIEWED
[FreeTextEntry1] : venous duplex-  left leg no evidence of DVT [place]. All major veins are patent and compressible. The greater saphenous vein is incompetent. His straight segment from the groin to the knee. In diameter at the junction is 5.0 mm at the distal thigh a 4.5 mm. Refill time is green and 6 seconds. No incompetent perforators seen. Negative deep system for reflux

## 2022-07-28 ENCOUNTER — OUTPATIENT (OUTPATIENT)
Dept: OUTPATIENT SERVICES | Facility: HOSPITAL | Age: 42
LOS: 1 days | Discharge: HOME | End: 2022-07-28

## 2022-07-28 ENCOUNTER — RESULT REVIEW (OUTPATIENT)
Age: 42
End: 2022-07-28

## 2022-07-28 DIAGNOSIS — Z98.890 OTHER SPECIFIED POSTPROCEDURAL STATES: Chronic | ICD-10-CM

## 2022-07-28 DIAGNOSIS — Z12.31 ENCOUNTER FOR SCREENING MAMMOGRAM FOR MALIGNANT NEOPLASM OF BREAST: ICD-10-CM

## 2022-07-28 DIAGNOSIS — R92.2 INCONCLUSIVE MAMMOGRAM: ICD-10-CM

## 2022-07-28 PROCEDURE — 77067 SCR MAMMO BI INCL CAD: CPT | Mod: 26

## 2022-07-28 PROCEDURE — 77063 BREAST TOMOSYNTHESIS BI: CPT | Mod: 26

## 2022-07-28 PROCEDURE — 76641 ULTRASOUND BREAST COMPLETE: CPT | Mod: 26,50

## 2022-08-05 ENCOUNTER — APPOINTMENT (OUTPATIENT)
Dept: VASCULAR SURGERY | Facility: CLINIC | Age: 42
End: 2022-08-05

## 2022-08-05 VITALS — SYSTOLIC BLOOD PRESSURE: 128 MMHG | DIASTOLIC BLOOD PRESSURE: 76 MMHG

## 2022-08-05 VITALS — BODY MASS INDEX: 24.48 KG/M2 | HEIGHT: 67 IN | WEIGHT: 156 LBS

## 2022-08-05 PROCEDURE — 99213 OFFICE O/P EST LOW 20 MIN: CPT

## 2022-08-09 ENCOUNTER — APPOINTMENT (OUTPATIENT)
Dept: SURGERY | Facility: CLINIC | Age: 42
End: 2022-08-09

## 2022-08-10 ENCOUNTER — APPOINTMENT (OUTPATIENT)
Dept: BREAST CENTER | Facility: CLINIC | Age: 42
End: 2022-08-10

## 2022-08-10 DIAGNOSIS — N60.12 DIFFUSE CYSTIC MASTOPATHY OF LEFT BREAST: ICD-10-CM

## 2022-08-10 DIAGNOSIS — N60.11 DIFFUSE CYSTIC MASTOPATHY OF LEFT BREAST: ICD-10-CM

## 2022-08-10 DIAGNOSIS — R92.2 INCONCLUSIVE MAMMOGRAM: ICD-10-CM

## 2022-08-10 PROCEDURE — 99212 OFFICE O/P EST SF 10 MIN: CPT

## 2022-08-10 NOTE — PHYSICAL EXAM
[Normocephalic] : normocephalic [Atraumatic] : atraumatic [No Supraclavicular Adenopathy] : no supraclavicular adenopathy [No dominant masses] : no dominant masses in right breast  [No dominant masses] : no dominant masses left breast [No Nipple Discharge] : no left nipple discharge [No Rashes] : no rashes [No Ulceration] : no ulceration [Breast Nipple Inversion] : nipples not inverted [Breast Nipple Retraction] : nipples not retracted [de-identified] : B/L nodular dense breast parenchyma. [de-identified] : No axillary lymphadenopathy appreciated. [de-identified] : No axillary lymphadenopathy appreciated.

## 2022-08-10 NOTE — DATA REVIEWED
[FreeTextEntry1] : B/L Screening Mammo - 07/28/2022:\par MAMMOGRAM FINDINGS:\par Mammography was performed including the following views: bilateral craniocaudal with tomosynthesis, bilateral mediolateral oblique with tomosynthesis.  The examination includes digital synthetic 2D and digital tomosynthesis 3D images. Additional imaging analysis was performed using CAD (computer-aided detection) software.\par \par The breasts are heterogeneously dense, which may obscure small masses.\par \par No suspicious mass, grouping of calcifications, or other abnormality is identified.\par \par IMPRESSION:\par No mammographic evidence of malignancy.\par \par RECOMMENDATION:\par Unless otherwise indicated by clinical findings, annual screening mammography recommended.\par \par ASSESSMENT:\par BI-RADS Category 1:  Negative\par \par \par B/L Breast Sono - 07/28/2022:\par Breast composition: The breasts are heterogeneously dense, which may obscure small masses.\par \par Findings:\par \par Ultrasound:\par \par Bilateral whole breast ultrasound was performed.\par Simple appearing cysts are noted in both breasts.\par No other solid or cystic mass noted in either breast. No right or left axillary lymphadenopathy.\par \par Impression: No sonographic evidence of malignancy.\par \par Recommendation: Unless otherwise indicated by clinical findings, annual screening mammography recommended.\par \par BI-RADS Category 2: Benign

## 2022-08-10 NOTE — HISTORY OF PRESENT ILLNESS
[FreeTextEntry1] : Patient with FHx breast cancer - paternal grandmother, age unknown.\par \par Her Meseret Model risk assessment: \par 0.2 % in five years \par 8.8 % in her lifetime\par \par \par ZOILA SETRADA is a 42 year old female patient who presents today in follow up for history of fibrocystic breast changes.\par Since her last visit, she has no new breast related complaints. \par \par Most recent imaging:\par B/L Screening Mammo - 07/28/2022:\par -The breasts are heterogeneously dense.\par -No suspicious mass, grouping of calcifications, or other abnormality is identified.\par -No mammographic evidence of malignancy.\par BI-RADS Category 1:  Negative\par \par B/L Breast Sono - 07/28/2022:\par -Simple appearing cysts are noted in both breasts.\par -No right or left axillary lymphadenopathy.\par -No sonographic evidence of malignancy.\par BI-RADS Category 2: Benign\par \par She presents today for evaluation and imaging review.

## 2022-08-10 NOTE — REASON FOR VISIT
[Follow-Up: _____] : a [unfilled] follow-up visit [FreeTextEntry1] : fibrocystic breast changes; imaging review.

## 2022-08-10 NOTE — ASSESSMENT
[FreeTextEntry1] : ZOILA is a boogie 42 year old patient who presented today in follow up for a history of fibrocystic breast changes\par She has been doing well with no new breast related complaints. \par \par Most recent imaging:\par B/L Screening Mammo - 07/28/2022:\par -The breasts are heterogeneously dense.\par -No suspicious mass, grouping of calcifications, or other abnormality is identified.\par -No mammographic evidence of malignancy.\par BI-RADS Category 1:  Negative\par \par B/L Breast Sono - 07/28/2022:\par -Simple appearing cysts are noted in both breasts.\par -No right or left axillary lymphadenopathy.\par -No sonographic evidence of malignancy.\par BI-RADS Category 2: Benign, as detailed above. \par \par Physical exam was unrevealing today.\par \par Imaging with a bilateral screening mammogram and sonogram will be due in July 2023, and that will be scheduled today. \par She will return for follow-up and clinical breast exam in one year.\par \par I spent a total of 15 minutes of face to face time with this patient, greater than 50% of which was spent in counseling and/or coordination of care.\par All of her questions were appropriately answered.\par She knows to call with any concerns.\par \par \par

## 2022-09-27 ENCOUNTER — APPOINTMENT (OUTPATIENT)
Dept: VASCULAR SURGERY | Facility: CLINIC | Age: 42
End: 2022-09-27

## 2022-09-27 PROCEDURE — 36478 ENDOVENOUS LASER 1ST VEIN: CPT | Mod: LT

## 2022-10-05 ENCOUNTER — APPOINTMENT (OUTPATIENT)
Dept: NEUROLOGY | Facility: CLINIC | Age: 42
End: 2022-10-05

## 2022-11-02 ENCOUNTER — APPOINTMENT (OUTPATIENT)
Dept: VASCULAR SURGERY | Facility: CLINIC | Age: 42
End: 2022-11-02

## 2022-11-02 DIAGNOSIS — I83.893 VARICOSE VEINS OF BILATERAL LOWER EXTREMITIES WITH OTHER COMPLICATIONS: ICD-10-CM

## 2022-11-02 PROCEDURE — 99213 OFFICE O/P EST LOW 20 MIN: CPT

## 2022-11-02 PROCEDURE — 93971 EXTREMITY STUDY: CPT | Mod: LT

## 2023-03-31 ENCOUNTER — APPOINTMENT (OUTPATIENT)
Dept: VASCULAR SURGERY | Facility: CLINIC | Age: 43
End: 2023-03-31
Payer: COMMERCIAL

## 2023-03-31 VITALS
BODY MASS INDEX: 24.48 KG/M2 | DIASTOLIC BLOOD PRESSURE: 74 MMHG | HEIGHT: 67 IN | WEIGHT: 156 LBS | SYSTOLIC BLOOD PRESSURE: 144 MMHG | HEART RATE: 83 BPM

## 2023-03-31 DIAGNOSIS — M79.661 PAIN IN RIGHT LOWER LEG: ICD-10-CM

## 2023-03-31 DIAGNOSIS — M79.89 PAIN IN RIGHT LOWER LEG: ICD-10-CM

## 2023-03-31 PROCEDURE — 93970 EXTREMITY STUDY: CPT

## 2023-03-31 PROCEDURE — 99213 OFFICE O/P EST LOW 20 MIN: CPT

## 2023-08-03 ENCOUNTER — OUTPATIENT (OUTPATIENT)
Dept: OUTPATIENT SERVICES | Facility: HOSPITAL | Age: 43
LOS: 1 days | End: 2023-08-03
Payer: COMMERCIAL

## 2023-08-03 ENCOUNTER — RESULT REVIEW (OUTPATIENT)
Age: 43
End: 2023-08-03

## 2023-08-03 ENCOUNTER — NON-APPOINTMENT (OUTPATIENT)
Age: 43
End: 2023-08-03

## 2023-08-03 DIAGNOSIS — Z00.8 ENCOUNTER FOR OTHER GENERAL EXAMINATION: ICD-10-CM

## 2023-08-03 DIAGNOSIS — Z98.890 OTHER SPECIFIED POSTPROCEDURAL STATES: Chronic | ICD-10-CM

## 2023-08-03 DIAGNOSIS — Z12.31 ENCOUNTER FOR SCREENING MAMMOGRAM FOR MALIGNANT NEOPLASM OF BREAST: ICD-10-CM

## 2023-08-03 DIAGNOSIS — R92.2 INCONCLUSIVE MAMMOGRAM: ICD-10-CM

## 2023-08-03 PROCEDURE — 77067 SCR MAMMO BI INCL CAD: CPT

## 2023-08-03 PROCEDURE — 76641 ULTRASOUND BREAST COMPLETE: CPT | Mod: 50

## 2023-08-03 PROCEDURE — 77063 BREAST TOMOSYNTHESIS BI: CPT | Mod: 26

## 2023-08-03 PROCEDURE — 76641 ULTRASOUND BREAST COMPLETE: CPT | Mod: 26,50

## 2023-08-03 PROCEDURE — 77063 BREAST TOMOSYNTHESIS BI: CPT

## 2023-08-03 PROCEDURE — 77067 SCR MAMMO BI INCL CAD: CPT | Mod: 26

## 2023-08-04 ENCOUNTER — APPOINTMENT (OUTPATIENT)
Dept: BREAST CENTER | Facility: CLINIC | Age: 43
End: 2023-08-04

## 2023-08-04 DIAGNOSIS — Z12.31 ENCOUNTER FOR SCREENING MAMMOGRAM FOR MALIGNANT NEOPLASM OF BREAST: ICD-10-CM

## 2023-08-04 DIAGNOSIS — R92.2 INCONCLUSIVE MAMMOGRAM: ICD-10-CM

## 2023-08-07 ENCOUNTER — RESULT REVIEW (OUTPATIENT)
Age: 43
End: 2023-08-07

## 2023-08-07 ENCOUNTER — OUTPATIENT (OUTPATIENT)
Dept: OUTPATIENT SERVICES | Facility: HOSPITAL | Age: 43
LOS: 1 days | End: 2023-08-07
Payer: COMMERCIAL

## 2023-08-07 DIAGNOSIS — Z98.890 OTHER SPECIFIED POSTPROCEDURAL STATES: Chronic | ICD-10-CM

## 2023-08-07 DIAGNOSIS — R92.8 OTHER ABNORMAL AND INCONCLUSIVE FINDINGS ON DIAGNOSTIC IMAGING OF BREAST: ICD-10-CM

## 2023-08-07 PROCEDURE — 76642 ULTRASOUND BREAST LIMITED: CPT | Mod: LT

## 2023-08-07 PROCEDURE — 76642 ULTRASOUND BREAST LIMITED: CPT | Mod: 26,LT

## 2023-08-08 DIAGNOSIS — R92.8 OTHER ABNORMAL AND INCONCLUSIVE FINDINGS ON DIAGNOSTIC IMAGING OF BREAST: ICD-10-CM

## 2023-08-17 ENCOUNTER — APPOINTMENT (OUTPATIENT)
Dept: BREAST CENTER | Facility: CLINIC | Age: 43
End: 2023-08-17
Payer: COMMERCIAL

## 2023-08-17 VITALS
DIASTOLIC BLOOD PRESSURE: 83 MMHG | HEIGHT: 67 IN | WEIGHT: 147 LBS | BODY MASS INDEX: 23.07 KG/M2 | SYSTOLIC BLOOD PRESSURE: 124 MMHG

## 2023-08-17 DIAGNOSIS — R92.8 OTHER ABNORMAL AND INCONCLUSIVE FINDINGS ON DIAGNOSTIC IMAGING OF BREAST: ICD-10-CM

## 2023-08-17 DIAGNOSIS — N63.20 UNSPECIFIED LUMP IN THE LEFT BREAST, UNSPECIFIED QUADRANT: ICD-10-CM

## 2023-08-17 PROCEDURE — 99215 OFFICE O/P EST HI 40 MIN: CPT

## 2023-08-17 NOTE — ASSESSMENT
[FreeTextEntry1] : Patient is a 43F with BIRADS 3 imaging.  She underwent bilateral scg mmg/us and subsequent left US in 8/2023 showing a left 3n8 7mm mass, possible a complicated cyst vs a fibroadenoma (BIRADS 3) with left US recommended in 6 months.  I had a lengthy discussion with this patient regarding diagnostic results, impressions, recommendations, risks and benefits.  I explained to the patient the BIRADS system of grading and that her findings fall into category 3. Category 3 carries a less than 5% risk of malignancy. She can choose to follow up imaging. She is also aware that she can also choose to biopsy the lesion if she wishes to. I briefly discussed the procedure will be performed by a breast imaging specialist, and will include numbing with local anesthesia, followed by a small biopsy marker placement afterwards, which is usually not bothersome, and is not recognized by radiofrequency devices.  She understands her options and wants to proceed with short term follow up imaging.  We discussed breast cysts. They are not pre-malignant nor do they have malignant potential and are hormonally influenced. They may grow or shrink in size as well as resolve spontaneously and there is usually no intervention unless they are symptomatic. In several large studies, patients with breast cysts and a positive family history had a higher relative risk of breast cancer (from 1.5 to 3). We also discussed fibroadenomas. I explained that they are benign lesions that do not have malignant potential. They are usually hormonally influenced and therefore may increase in size over time. Patients with these lesions may have a slightly increased relative risk of breast cancer compared to the reference population. I described the options for management including observation with serial imaging as well as surgical excision. I explained the indications for excision including symptoms, unclear diagnosis, abnormal imaging features, discordance, or increase in size (usually to > 3 cm).  All questions and concerns were answered in detail.  Patient is for left breast US in 2/2024.  She is to follow up after imaging, pending any interval changes.  Total time spent on encounter was greater than 40 minutes, which included face to face time with the patient, performing an exam, reviewing previous medical records including imaging/ pathology, documenting in patient record and coordinating care/imaging. Greater than 50% of the encounter was spent on counseling and coordination of her breast issue.

## 2023-08-17 NOTE — HISTORY OF PRESENT ILLNESS
[FreeTextEntry1] : ZOILA ESTRADA is a 43 year old female patient with BIRADS 3 imaging.  FHx breast cancer - paternal grandmother, age unknown.  Her Meseret Model risk assessment:  0.2 % in five years  8.8 % in her lifetime  Most recent imagin/3/23: B/L Screening Mammo and US --> BIRADS 2 and BIRADS 0 -The breasts are heterogeneously dense, which may obscure small masses. -There are multiple circumscribed fluctuating masses bilaterally, compatible with benign cysts. -There is no mammographic evidence of malignancy. Right breast: -At the 11:00 position 7 to 8 cm from the nipple, there is a stable benign mass measuring 0.8 x 0.6 x 0.3 cm. -At the 10:00 position 10 to 11 cm from the nipple, there is a stable benign mass measuring 0.6 x 0.6 x 0.4 cm. -Additional scattered benign cysts.  Left breast: -At the 3:00 position 8 cm from the nipple, there is a circumscribed hypoechoic mass measuring 0.7 x 0.6 x 0.3 cm, indeterminate. Targeted real-time evaluation is recommended. -Additional scattered benign cysts.    23: Left US --> BIRADS 3 -At 3:00 8 cm from the nipple, there is an ovoid well-circumscribed hypoechoic mass with posterior acoustic enhancement. It measures 7 x 6 x 3 mm This may reflect a complicated cyst as a simple cyst can be seen in the similar location in 2022. A fibroadenoma can have a similar appearance. Recommendation: Follow-up breast ultrasound in 6 months.  Denies any current complaints. No new changes to her breasts.

## 2023-08-17 NOTE — PHYSICAL EXAM
[Normocephalic] : normocephalic [EOMI] : extra ocular movement intact [No Supraclavicular Adenopathy] : no supraclavicular adenopathy [No Cervical Adenopathy] : no cervical adenopathy [Examined in the supine and seated position] : examined in the supine and seated position [No dominant masses] : no dominant masses in right breast  [No dominant masses] : no dominant masses left breast [No Nipple Retraction] : no left nipple retraction [No Nipple Discharge] : no left nipple discharge [No Axillary Lymphadenopathy] : no left axillary lymphadenopathy [No Rashes] : no rashes [No Ulceration] : no ulceration [Breast Nipple Inversion] : nipples not inverted [Breast Nipple Retraction] : nipples not retracted [de-identified] : Nl respirations

## 2023-08-17 NOTE — DATA REVIEWED
[FreeTextEntry1] : B/L Screening Mammo - 08/03/2023: The breasts are heterogeneously dense, which may obscure small masses.  There are multiple circumscribed fluctuating masses bilaterally, compatible with benign cysts.  No suspicious mass, grouping of calcifications, or other abnormality is identified.  IMPRESSION: There is no mammographic evidence of malignancy.  RECOMMENDATION: Unless otherwise indicated by clinical findings, annual screening mammography recommended.  ASSESSMENT: BI-RADS Category 2:  Benign   B/L Breast Sono - 08/03/2023: Findings:  Ultrasound:  Bilateral whole breast ultrasound was performed.  Right breast: At the 11:00 position 7 to 8 cm from the nipple, there is a stable benign mass measuring 0.8 x 0.6 x 0.3 cm. At the 10:00 position 10 to 11 cm from the nipple, there is a stable benign mass measuring 0.6 x 0.6 x 0.4 cm.  Additional scattered benign cysts. No axillary adenopathy.  Left breast: At the 3:00 position 8 cm from the nipple, there is a circumscribed hypoechoic mass measuring 0.7 x 0.6 x 0.3 cm, indeterminate. Targeted real-time evaluation is recommended.  Additional scattered benign cysts. No axillary adenopathy.  Impression: No sonographic evidence of malignancy of the right breast. Indeterminate left breast 3:00 position mass for which targeted evaluation is recommended.  RECOMMENDATION: Patient will be recalled for ultrasound.  ASSESSMENT: BI-RADS Category 0:  Incomplete: Needs Additional Imaging Evaluation   Left Targeted Sono - 08/07/2023: At 3:00 8 cm from the nipple, there is an ovoid well-circumscribed hypoechoic mass with posterior acoustic enhancement. It measures 7 x 6 x 3 mm This may reflect a complicated cyst as a simple cyst can be seen in the similar location in July 2022. A fibroadenoma can have a similar appearance.    IMPRESSION:   Ovoid mass in the left breast is probably benign   Recommendation: Follow-up breast ultrasound in 6 months.  BI-RADS category 3: Probably Benign

## 2023-08-17 NOTE — REASON FOR VISIT
[Follow-Up: _____] : a [unfilled] follow-up visit [FreeTextEntry1] : fibrocystic breast changes; BIRADS-3 imaging review.

## 2023-08-28 ENCOUNTER — RESULT REVIEW (OUTPATIENT)
Age: 43
End: 2023-08-28

## 2023-08-28 ENCOUNTER — APPOINTMENT (OUTPATIENT)
Age: 43
End: 2023-08-28
Payer: COMMERCIAL

## 2023-08-28 ENCOUNTER — OUTPATIENT (OUTPATIENT)
Dept: OUTPATIENT SERVICES | Facility: HOSPITAL | Age: 43
LOS: 1 days | End: 2023-08-28
Payer: COMMERCIAL

## 2023-08-28 DIAGNOSIS — Z98.890 OTHER SPECIFIED POSTPROCEDURAL STATES: Chronic | ICD-10-CM

## 2023-08-28 DIAGNOSIS — Z00.8 ENCOUNTER FOR OTHER GENERAL EXAMINATION: ICD-10-CM

## 2023-08-28 DIAGNOSIS — R92.8 OTHER ABNORMAL AND INCONCLUSIVE FINDINGS ON DIAGNOSTIC IMAGING OF BREAST: ICD-10-CM

## 2023-08-28 PROCEDURE — 77065 DX MAMMO INCL CAD UNI: CPT | Mod: LT

## 2023-08-28 PROCEDURE — 19083 BX BREAST 1ST LESION US IMAG: CPT | Mod: LT

## 2023-08-28 PROCEDURE — 88305 TISSUE EXAM BY PATHOLOGIST: CPT | Mod: 26

## 2023-08-28 PROCEDURE — 77065 DX MAMMO INCL CAD UNI: CPT | Mod: 26,LT

## 2023-08-28 PROCEDURE — 88305 TISSUE EXAM BY PATHOLOGIST: CPT

## 2023-08-28 PROCEDURE — A4648: CPT

## 2023-08-29 LAB — SURGICAL PATHOLOGY STUDY: SIGNIFICANT CHANGE UP

## 2023-08-30 DIAGNOSIS — N63.20 UNSPECIFIED LUMP IN THE LEFT BREAST, UNSPECIFIED QUADRANT: ICD-10-CM

## 2023-08-30 DIAGNOSIS — N60.02 SOLITARY CYST OF LEFT BREAST: ICD-10-CM

## 2023-08-31 ENCOUNTER — NON-APPOINTMENT (OUTPATIENT)
Age: 43
End: 2023-08-31

## 2023-09-07 ENCOUNTER — APPOINTMENT (OUTPATIENT)
Dept: BREAST CENTER | Facility: CLINIC | Age: 43
End: 2023-09-07

## 2024-02-15 ENCOUNTER — APPOINTMENT (OUTPATIENT)
Dept: BREAST CENTER | Facility: CLINIC | Age: 44
End: 2024-02-15

## 2024-05-23 ENCOUNTER — APPOINTMENT (OUTPATIENT)
Dept: NEUROLOGY | Facility: CLINIC | Age: 44
End: 2024-05-23
Payer: COMMERCIAL

## 2024-05-23 VITALS
SYSTOLIC BLOOD PRESSURE: 113 MMHG | HEART RATE: 69 BPM | BODY MASS INDEX: 23.86 KG/M2 | OXYGEN SATURATION: 98 % | WEIGHT: 152 LBS | DIASTOLIC BLOOD PRESSURE: 73 MMHG | RESPIRATION RATE: 16 BRPM | HEIGHT: 67 IN

## 2024-05-23 DIAGNOSIS — E23.7 DISORDER OF PITUITARY GLAND, UNSPECIFIED: ICD-10-CM

## 2024-05-23 PROCEDURE — 99214 OFFICE O/P EST MOD 30 MIN: CPT

## 2024-05-23 NOTE — DISCUSSION/SUMMARY
[FreeTextEntry1] : Patient is to check if her recent blood work includes B12 level. If less than 500, recommend her to take empirical PO supplement. Proceed to have EEG study. She is planning ENT consult for symptom of left ear.

## 2024-05-23 NOTE — PHYSICAL EXAM
[FreeTextEntry1] : General Appearance - Well groomed, Not in acute distress. Build & Nutrition - Well nourished.  Head and Neck Head - normocephalic, atraumatic with no lesions or palpable masses. Neck Global Assessment - no bruit auscultated on the right, no bruit auscultated on the left.  Neurologic Mental Status Affect - normal. Speech - Normal. Thought content/perception - Normal. Cognitive function - Normal. Cranial Nerves I Olfactory - Normal. II Optic - Visual fields - Normal. III Oculomotor - Normal Bilaterally. IV Trochlear - Bilateral - Normal - Bilateral. V Trigeminal - Normal Bilaterally. VI Abducens - Bilateral - Normal - Bilateral. VII Facial - Normal Bilaterally. VIII Acoustic - Bilateral - Hearing normal - Bilateral. IX Glossopharyngeal / X Vagus - Normal. XI Accessory - Normal Bilaterally. XII Hypoglossal - Bilateral - Normal - Bilateral. Sensory - Normal. Motor Bulk and Contour - Normal. Tone - Normal. Strength - 5/5 normal muscle strength - All Muscles. Reflexes (Dermatomes) 2/2 Normal - All. Plantar Reflexes (L4-S2) - Bilateral - Flexion - Bilateral. Coordination - Normal. Gait - Normal.  Results of Diagnostic Studies  Imaging - Imaging - MRI - Note: 9/27/22 brain: reported possible pituitary hyperplasia. Images reviewed. 2/23/24 brain, pituitary with and without contrast: reported no change of enlarged pituitary gland.  Neurophysiological Testing - Note: 10/12/22 EEG: normal.  Neuropsychiatric Mental status exam performed with findings of - no evidence of hallucinations, delusions, obsessions or homicidal/suicidal ideation.

## 2024-05-23 NOTE — HISTORY OF PRESENT ILLNESS
[FreeTextEntry1] : Last office visit: 11/01/2022  About three and a half years ago, she started to notice occasional episodes of forgetfulness.  In July of 2022, she developed sudden onset of blurred vision. She was not able to focus. There was an intense headache. She was also somewhat confused and lightheaded. The intense symptoms lasted about 10 minutes. However, she felt "un focus" for the rest of the day. The visual symptom is only once in a blue month now. She does not have recurrent headache.  Since last visit, she consulted endocrinologist. Repeat brain MRI with and without contrast n 2/23/24 reported no change of enlarged pituitary gland. No need for intervention for now.  At this point, her forgetfulness has gotten more frequent, which is a big contrast to her baseline. She asks people of their names more often. She does not like the sense of a detachment from her surroundings although she does not have the intense symptoms now.  She does not feel that she is baptiste now. No recurrent paresthesia or joint pain.  She denies vestibular, olfactory or gustatory symptoms. However, in her left ear, she feels congested. No tinnitus. The feeling of congestion in the left ear at times wakes her up in the middle of sleep.  Had recent physical in March.

## 2024-06-06 ENCOUNTER — APPOINTMENT (OUTPATIENT)
Dept: NEUROLOGY | Facility: CLINIC | Age: 44
End: 2024-06-06
Payer: COMMERCIAL

## 2024-06-06 PROCEDURE — 95816 EEG AWAKE AND DROWSY: CPT

## 2024-06-12 ENCOUNTER — APPOINTMENT (OUTPATIENT)
Dept: NEUROLOGY | Facility: CLINIC | Age: 44
End: 2024-06-12
Payer: COMMERCIAL

## 2024-06-12 DIAGNOSIS — R41.89 OTHER SYMPTOMS AND SIGNS INVOLVING COGNITIVE FUNCTIONS AND AWARENESS: ICD-10-CM

## 2024-06-12 PROCEDURE — 99441: CPT

## 2024-08-09 ENCOUNTER — RESULT REVIEW (OUTPATIENT)
Age: 44
End: 2024-08-09

## 2024-08-09 ENCOUNTER — OUTPATIENT (OUTPATIENT)
Dept: OUTPATIENT SERVICES | Facility: HOSPITAL | Age: 44
LOS: 1 days | End: 2024-08-09
Payer: COMMERCIAL

## 2024-08-09 DIAGNOSIS — Z98.890 OTHER SPECIFIED POSTPROCEDURAL STATES: Chronic | ICD-10-CM

## 2024-08-09 DIAGNOSIS — R92.2 INCONCLUSIVE MAMMOGRAM: ICD-10-CM

## 2024-08-09 DIAGNOSIS — Z12.31 ENCOUNTER FOR SCREENING MAMMOGRAM FOR MALIGNANT NEOPLASM OF BREAST: ICD-10-CM

## 2024-08-09 PROCEDURE — 77067 SCR MAMMO BI INCL CAD: CPT | Mod: 26

## 2024-08-09 PROCEDURE — 77067 SCR MAMMO BI INCL CAD: CPT

## 2024-08-09 PROCEDURE — 77063 BREAST TOMOSYNTHESIS BI: CPT

## 2024-08-09 PROCEDURE — 76641 ULTRASOUND BREAST COMPLETE: CPT | Mod: 26,50

## 2024-08-09 PROCEDURE — 77063 BREAST TOMOSYNTHESIS BI: CPT | Mod: 26

## 2024-08-09 PROCEDURE — 76641 ULTRASOUND BREAST COMPLETE: CPT | Mod: 50

## 2024-08-10 DIAGNOSIS — Z12.31 ENCOUNTER FOR SCREENING MAMMOGRAM FOR MALIGNANT NEOPLASM OF BREAST: ICD-10-CM

## 2024-08-10 DIAGNOSIS — R92.2 INCONCLUSIVE MAMMOGRAM: ICD-10-CM

## 2024-09-03 ENCOUNTER — APPOINTMENT (OUTPATIENT)
Dept: BREAST CENTER | Facility: CLINIC | Age: 44
End: 2024-09-03
Payer: COMMERCIAL

## 2024-09-03 VITALS
DIASTOLIC BLOOD PRESSURE: 72 MMHG | HEART RATE: 79 BPM | HEIGHT: 67 IN | BODY MASS INDEX: 23.86 KG/M2 | SYSTOLIC BLOOD PRESSURE: 129 MMHG | WEIGHT: 152 LBS

## 2024-09-03 DIAGNOSIS — R92.30 DENSE BREASTS, UNSPECIFIED: ICD-10-CM

## 2024-09-03 DIAGNOSIS — N60.02 SOLITARY CYST OF LEFT BREAST: ICD-10-CM

## 2024-09-03 PROCEDURE — 99214 OFFICE O/P EST MOD 30 MIN: CPT

## 2024-09-09 PROBLEM — N60.02 CYST OF LEFT BREAST: Status: ACTIVE | Noted: 2024-09-09

## 2024-09-09 PROBLEM — R92.30 DENSE BREASTS: Status: ACTIVE | Noted: 2018-08-14

## 2024-09-09 NOTE — DATA REVIEWED
[FreeTextEntry1] : [id number]     EXAM:  MG MAMMO SCREEN W REGGIE BI#   ORDERED BY: YENIFER MCCORMACK  PROCEDURE DATE:  08/09/2024    INTERPRETATION:  HISTORY: Bilateral MG MAMMO SCREEN W REGGIE BI# was performed. Patient is 44 years old and is seen for screening. The patient has no personal history of cancer.  The patient has the following family history of breast cancer:  paternal grandmother, breast cancer.  RISK ASSESSMENT: NCI Lifetime Risk: 13.6 Tyrer-Cuzick Lifetime Risk: 28.8  CLINICAL BREAST EXAM: The patient reports their last clinical breast exam was performed within the past year.  COMPARISON STUDIES: The present examination has been compared to prior imaging studies performed at [facility] on 12/04/2020 and 07/26/2021, and at [facility] on 07/28/2022, 08/03/2023 and 08/28/2023.  MAMMOGRAM FINDINGS: Mammography was performed including the following views: bilateral craniocaudal with tomosynthesis, bilateral mediolateral oblique with tomosynthesis.  The examination includes digital synthetic 2D and digital tomosynthesis 3D images. Additional imaging analysis was performed using CAD (computer-aided detection) software.  The breasts are heterogeneously dense, which may obscure small masses.  There is a benign architectural distortion from prior biopsy adjacent to the biopsy clip. seen in the left breast.  No suspicious mass, grouping of calcifications, or other abnormality is identified.  IMPRESSION: There is no mammographic evidence of malignancy.  In light of dense parenchyma and increased probability of breast cancer, consideration may be given to a screening ultrasound and breast MRI.  RECOMMENDATION: Unless otherwise indicated by clinical findings, annual screening mammography recommended.  ASSESSMENT: BI-RADS Category 2:  Benign  CC: [id number]     EXAM:   US BREAST COMPLETE BI   ORDERED BY: YENIFER MCCORMACK  PROCEDURE DATE:  08/09/2024    INTERPRETATION:  Clinical History / Reason for exam: Screening bilateral breast ultrasound.  The patient reports last clinical breast examination was performed about: Within the past year.  Family history of breast cancer: Paternal grandmother.  Comparisons: Breast ultrasound dating back to 2022.  Breast composition: The breasts are heterogeneously dense, which may obscure small masses.  Findings:  Ultrasound:  Bilateral whole breast ultrasound was performed. At 11:00 N 7-8 of the right breast there is a stable hypoechoic mass measuring 0.8 x 0.6 x 0.3 cm. At 10:00 N 10-11 of the right breast there is a stable hypoechoic mass measuring 0.6 x 0.6 x 0.4 cm. At 2:00 N8 of the left breast there is a stable previously biopsied benign hypoechoic mass measuring 0.5 x 0.5 x 0.3 cm. No other solid or cystic mass noted in either breast. No right or left axillary lymphadenopathy.  Impression: No sonographic evidence of malignancy. Stable bilateral breast masses.  Recommendation: Unless otherwise indicated by clinical findings, annual screening mammography recommended.  BI-RADS Category 2: Benign

## 2024-09-09 NOTE — DATA REVIEWED
[FreeTextEntry1] : [id number]     EXAM:  MG MAMMO SCREEN W REGGIE BI#   ORDERED BY: YENIFER MCCORMACK  PROCEDURE DATE:  08/09/2024    INTERPRETATION:  HISTORY: Bilateral MG MAMMO SCREEN W REGGIE BI# was performed. Patient is 44 years old and is seen for screening. The patient has no personal history of cancer.  The patient has the following family history of breast cancer:  paternal grandmother, breast cancer.  RISK ASSESSMENT: NCI Lifetime Risk: 13.6 Tyrer-Cuzick Lifetime Risk: 28.8  CLINICAL BREAST EXAM: The patient reports their last clinical breast exam was performed within the past year.  COMPARISON STUDIES: The present examination has been compared to prior imaging studies performed at [facility] on 12/04/2020 and 07/26/2021, and at [facility] on 07/28/2022, 08/03/2023 and 08/28/2023.  MAMMOGRAM FINDINGS: Mammography was performed including the following views: bilateral craniocaudal with tomosynthesis, bilateral mediolateral oblique with tomosynthesis.  The examination includes digital synthetic 2D and digital tomosynthesis 3D images. Additional imaging analysis was performed using CAD (computer-aided detection) software.  The breasts are heterogeneously dense, which may obscure small masses.  There is a benign architectural distortion from prior biopsy adjacent to the biopsy clip. seen in the left breast.  No suspicious mass, grouping of calcifications, or other abnormality is identified.  IMPRESSION: There is no mammographic evidence of malignancy.  In light of dense parenchyma and increased probability of breast cancer, consideration may be given to a screening ultrasound and breast MRI.  RECOMMENDATION: Unless otherwise indicated by clinical findings, annual screening mammography recommended.  ASSESSMENT: BI-RADS Category 2:  Benign  CC: [id number]     EXAM:   US BREAST COMPLETE BI   ORDERED BY: YENIFER MCCORMACK  PROCEDURE DATE:  08/09/2024    INTERPRETATION:  Clinical History / Reason for exam: Screening bilateral breast ultrasound.  The patient reports last clinical breast examination was performed about: Within the past year.  Family history of breast cancer: Paternal grandmother.  Comparisons: Breast ultrasound dating back to 2022.  Breast composition: The breasts are heterogeneously dense, which may obscure small masses.  Findings:  Ultrasound:  Bilateral whole breast ultrasound was performed. At 11:00 N 7-8 of the right breast there is a stable hypoechoic mass measuring 0.8 x 0.6 x 0.3 cm. At 10:00 N 10-11 of the right breast there is a stable hypoechoic mass measuring 0.6 x 0.6 x 0.4 cm. At 2:00 N8 of the left breast there is a stable previously biopsied benign hypoechoic mass measuring 0.5 x 0.5 x 0.3 cm. No other solid or cystic mass noted in either breast. No right or left axillary lymphadenopathy.  Impression: No sonographic evidence of malignancy. Stable bilateral breast masses.  Recommendation: Unless otherwise indicated by clinical findings, annual screening mammography recommended.  BI-RADS Category 2: Benign

## 2024-09-09 NOTE — PHYSICAL EXAM
[Normocephalic] : normocephalic [EOMI] : extra ocular movement intact [No Supraclavicular Adenopathy] : no supraclavicular adenopathy [No Cervical Adenopathy] : no cervical adenopathy [Examined in the supine and seated position] : examined in the supine and seated position [No dominant masses] : no dominant masses in right breast  [No dominant masses] : no dominant masses left breast [No Nipple Retraction] : no left nipple retraction [No Nipple Discharge] : no left nipple discharge [No Axillary Lymphadenopathy] : no left axillary lymphadenopathy [No Rashes] : no rashes [No Ulceration] : no ulceration [de-identified] : NL respirations

## 2024-09-09 NOTE — HISTORY OF PRESENT ILLNESS
[FreeTextEntry1] : ZOILA ESTRADA is a 44-year-old female patient with h/o BIRADS 3 imaging.  FHx breast cancer - paternal grandmother, age unknown.  Her Meseret Model risk assessment:  0.2 % in five years  8.8 % in her lifetime  Most recent imagin/3/23: B/L Screening Mammo and US --> BIRADS 2 and BIRADS 0 -The breasts are heterogeneously dense, which may obscure small masses. -There are multiple circumscribed fluctuating masses bilaterally, compatible with benign cysts. -There is no mammographic evidence of malignancy. Right breast: -At the 11:00 position 7 to 8 cm from the nipple, there is a stable benign mass measuring 0.8 x 0.6 x 0.3 cm. -At the 10:00 position 10 to 11 cm from the nipple, there is a stable benign mass measuring 0.6 x 0.6 x 0.4 cm. -Additional scattered benign cysts.  Left breast: -At the 3:00 position 8 cm from the nipple, there is a circumscribed hypoechoic mass measuring 0.7 x 0.6 x 0.3 cm, indeterminate. Targeted real-time evaluation is recommended. -Additional scattered benign cysts.    23: Left US --> BIRADS 3 -At 3:00 8 cm from the nipple, there is an ovoid well-circumscribed hypoechoic mass with posterior acoustic enhancement. It measures 7 x 6 x 3 mm This may reflect a complicated cyst as a simple cyst can be seen in the similar location in 2022. A fibroadenoma can have a similar appearance. Recommendation: Follow-up breast ultrasound in 6 months.  US Guided Core Bx - 2023: Left, 3:00 N8, 7mm: (stop-light) - APOCRINE CYST. - PROLIFERATIVE TYPE FIBROCYSTIC CHANGES INCLUDING FOCAL FLORID DUCT HYPERPLASIA, STROMAL FIBROSIS, AND SCLEROSING ADENOSIS. Findings are benign concordant. Recommendations: Routine annual screening.  2024 b/l mammo and US -->birads2 breasts are heterogeneously dense benign architectural distortion from prior biopsy adjacent to the biopsy clip. seen in the left breast. Bilateral whole breast ultrasound was performed. At 11:00 N 7-8 of the right breast there is a stable hypoechoic mass measuring 0.8 x 0.6 x 0.3 cm. At 10:00 N 10-11 of the right breast there is a stable hypoechoic mass measuring 0.6 x 0.6 x 0.4 cm. At 2:00 N8 of the left breast there is a stable previously biopsied benign hypoechoic mass measuring 0.5 x 0.5 x 0.3 cm.  Denies any current complaints. No new changes to her breasts.

## 2024-09-09 NOTE — PHYSICAL EXAM
[Normocephalic] : normocephalic [EOMI] : extra ocular movement intact [No Supraclavicular Adenopathy] : no supraclavicular adenopathy [No Cervical Adenopathy] : no cervical adenopathy [Examined in the supine and seated position] : examined in the supine and seated position [No dominant masses] : no dominant masses in right breast  [No dominant masses] : no dominant masses left breast [No Nipple Retraction] : no left nipple retraction [No Nipple Discharge] : no left nipple discharge [No Axillary Lymphadenopathy] : no left axillary lymphadenopathy [No Rashes] : no rashes [No Ulceration] : no ulceration [de-identified] : NL respirations

## 2024-09-09 NOTE — PHYSICAL EXAM
[Normocephalic] : normocephalic [EOMI] : extra ocular movement intact [No Supraclavicular Adenopathy] : no supraclavicular adenopathy [No Cervical Adenopathy] : no cervical adenopathy [Examined in the supine and seated position] : examined in the supine and seated position [No dominant masses] : no dominant masses in right breast  [No dominant masses] : no dominant masses left breast [No Nipple Retraction] : no left nipple retraction [No Nipple Discharge] : no left nipple discharge [No Axillary Lymphadenopathy] : no left axillary lymphadenopathy [No Rashes] : no rashes [No Ulceration] : no ulceration [de-identified] : NL respirations

## 2024-09-09 NOTE — ASSESSMENT
[FreeTextEntry1] : Patient is a 43F with breast cyst.  She underwent bilateral scg mmg/us and subsequent left US in 8/2023 showing a left 3n8 7mm mass, possible a complicated cyst vs a fibroadenoma (BIRADS 3) with left US recommended in 6 months.  She underwent left breast biopsy which showed an apocrine cyst.  She had bilateral mmg/us in 8/2024 which showed  bilateral stable masses (BIRADS 2).  We discussed breast cysts. They are not pre-malignant nor do they have malignant potential and are hormonally influenced.  They may grow or shrink in size as well as resolve spontaneously and there is usually no intervention unless they are symptomatic.  In several large studies, patients with breast cysts and a positive family history had a higher relative risk of breast cancer (from 1.5 to 3).    We discussed breast density.  Increasing breast density has been found to increase ones risk of breast cancer, but at this time, there is no clear indication for additional imaging in this setting, as both US and MRI have not been found to improve survival.  One can consider bilateral screening US.  However, out of 1000 women screened, the use of routine US will only identify an additional 3-5 cancers.  The use of US was found to increase the likelihood of undergoing more imaging and more biopsies.  She does have dense breasts.  All questions and concerns were answered in detail.  Patient is for bilateral mmg/us in 8/2025. She is to follow up after imaging, pending any interval changes.  Total time spent on encounter was greater than 30 minutes, which included face to face time with the patient, performing an exam, reviewing previous medical records including imaging/ pathology, documenting in patient record and coordinating care/imaging. Greater than 50% of the encounter was spent on counseling and coordination of her breast issue.

## 2024-09-09 NOTE — DATA REVIEWED
[FreeTextEntry1] : [id number]     EXAM:  MG MAMMO SCREEN W REGGIE BI#   ORDERED BY: YENIFER MCCORMACK  PROCEDURE DATE:  08/09/2024    INTERPRETATION:  HISTORY: Bilateral MG MAMMO SCREEN W REGGIE BI# was performed. Patient is 44 years old and is seen for screening. The patient has no personal history of cancer.  The patient has the following family history of breast cancer:  paternal grandmother, breast cancer.  RISK ASSESSMENT: NCI Lifetime Risk: 13.6 Tyrer-Cuzick Lifetime Risk: 28.8  CLINICAL BREAST EXAM: The patient reports their last clinical breast exam was performed within the past year.  COMPARISON STUDIES: The present examination has been compared to prior imaging studies performed at [facility] on 12/04/2020 and 07/26/2021, and at [facility] on 07/28/2022, 08/03/2023 and 08/28/2023.  MAMMOGRAM FINDINGS: Mammography was performed including the following views: bilateral craniocaudal with tomosynthesis, bilateral mediolateral oblique with tomosynthesis.  The examination includes digital synthetic 2D and digital tomosynthesis 3D images. Additional imaging analysis was performed using CAD (computer-aided detection) software.  The breasts are heterogeneously dense, which may obscure small masses.  There is a benign architectural distortion from prior biopsy adjacent to the biopsy clip. seen in the left breast.  No suspicious mass, grouping of calcifications, or other abnormality is identified.  IMPRESSION: There is no mammographic evidence of malignancy.  In light of dense parenchyma and increased probability of breast cancer, consideration may be given to a screening ultrasound and breast MRI.  RECOMMENDATION: Unless otherwise indicated by clinical findings, annual screening mammography recommended.  ASSESSMENT: BI-RADS Category 2:  Benign  CC: [id number]     EXAM:   US BREAST COMPLETE BI   ORDERED BY: YENIFER MCCORMACK  PROCEDURE DATE:  08/09/2024    INTERPRETATION:  Clinical History / Reason for exam: Screening bilateral breast ultrasound.  The patient reports last clinical breast examination was performed about: Within the past year.  Family history of breast cancer: Paternal grandmother.  Comparisons: Breast ultrasound dating back to 2022.  Breast composition: The breasts are heterogeneously dense, which may obscure small masses.  Findings:  Ultrasound:  Bilateral whole breast ultrasound was performed. At 11:00 N 7-8 of the right breast there is a stable hypoechoic mass measuring 0.8 x 0.6 x 0.3 cm. At 10:00 N 10-11 of the right breast there is a stable hypoechoic mass measuring 0.6 x 0.6 x 0.4 cm. At 2:00 N8 of the left breast there is a stable previously biopsied benign hypoechoic mass measuring 0.5 x 0.5 x 0.3 cm. No other solid or cystic mass noted in either breast. No right or left axillary lymphadenopathy.  Impression: No sonographic evidence of malignancy. Stable bilateral breast masses.  Recommendation: Unless otherwise indicated by clinical findings, annual screening mammography recommended.  BI-RADS Category 2: Benign

## 2025-05-27 ENCOUNTER — APPOINTMENT (OUTPATIENT)
Dept: ORTHOPEDIC SURGERY | Facility: CLINIC | Age: 45
End: 2025-05-27
Payer: COMMERCIAL

## 2025-05-27 VITALS — WEIGHT: 159 LBS | BODY MASS INDEX: 24.96 KG/M2 | HEIGHT: 67 IN

## 2025-05-27 DIAGNOSIS — M75.31 CALCIFIC TENDINITIS OF RIGHT SHOULDER: ICD-10-CM

## 2025-05-27 PROCEDURE — 73030 X-RAY EXAM OF SHOULDER: CPT | Mod: RT

## 2025-05-27 PROCEDURE — 73080 X-RAY EXAM OF ELBOW: CPT | Mod: 50

## 2025-05-27 PROCEDURE — 99203 OFFICE O/P NEW LOW 30 MIN: CPT

## 2025-05-27 RX ORDER — TIZANIDINE 4 MG/1
4 TABLET ORAL
Qty: 7 | Refills: 1 | Status: ACTIVE | COMMUNITY
Start: 2025-05-27 | End: 1900-01-01

## 2025-05-27 RX ORDER — IBUPROFEN 800 MG/1
800 TABLET, FILM COATED ORAL 3 TIMES DAILY
Qty: 90 | Refills: 0 | Status: ACTIVE | COMMUNITY
Start: 2025-05-27 | End: 1900-01-01

## 2025-06-19 ENCOUNTER — APPOINTMENT (OUTPATIENT)
Dept: ORTHOPEDIC SURGERY | Facility: CLINIC | Age: 45
End: 2025-06-19
Payer: COMMERCIAL

## 2025-06-19 PROCEDURE — 99203 OFFICE O/P NEW LOW 30 MIN: CPT

## 2025-08-13 ENCOUNTER — RESULT REVIEW (OUTPATIENT)
Age: 45
End: 2025-08-13

## 2025-08-13 ENCOUNTER — OUTPATIENT (OUTPATIENT)
Dept: OUTPATIENT SERVICES | Facility: HOSPITAL | Age: 45
LOS: 1 days | End: 2025-08-13
Payer: COMMERCIAL

## 2025-08-13 DIAGNOSIS — R92.2 INCONCLUSIVE MAMMOGRAM: ICD-10-CM

## 2025-08-13 DIAGNOSIS — Z98.890 OTHER SPECIFIED POSTPROCEDURAL STATES: Chronic | ICD-10-CM

## 2025-08-13 DIAGNOSIS — Z12.31 ENCOUNTER FOR SCREENING MAMMOGRAM FOR MALIGNANT NEOPLASM OF BREAST: ICD-10-CM

## 2025-08-13 PROCEDURE — 77063 BREAST TOMOSYNTHESIS BI: CPT

## 2025-08-13 PROCEDURE — 77067 SCR MAMMO BI INCL CAD: CPT

## 2025-08-13 PROCEDURE — 77063 BREAST TOMOSYNTHESIS BI: CPT | Mod: 26

## 2025-08-13 PROCEDURE — 77067 SCR MAMMO BI INCL CAD: CPT | Mod: 26

## 2025-08-13 PROCEDURE — 76641 ULTRASOUND BREAST COMPLETE: CPT | Mod: 26,50

## 2025-08-13 PROCEDURE — 76641 ULTRASOUND BREAST COMPLETE: CPT | Mod: 50

## 2025-08-14 DIAGNOSIS — Z12.31 ENCOUNTER FOR SCREENING MAMMOGRAM FOR MALIGNANT NEOPLASM OF BREAST: ICD-10-CM

## 2025-08-14 DIAGNOSIS — R92.2 INCONCLUSIVE MAMMOGRAM: ICD-10-CM

## 2025-08-18 ENCOUNTER — APPOINTMENT (OUTPATIENT)
Dept: BREAST CENTER | Facility: CLINIC | Age: 45
End: 2025-08-18
Payer: COMMERCIAL

## 2025-08-18 ENCOUNTER — NON-APPOINTMENT (OUTPATIENT)
Age: 45
End: 2025-08-18

## 2025-08-18 VITALS
WEIGHT: 154 LBS | HEIGHT: 67 IN | HEART RATE: 71 BPM | DIASTOLIC BLOOD PRESSURE: 74 MMHG | SYSTOLIC BLOOD PRESSURE: 124 MMHG | BODY MASS INDEX: 24.17 KG/M2

## 2025-08-18 DIAGNOSIS — N60.02 SOLITARY CYST OF RIGHT BREAST: ICD-10-CM

## 2025-08-18 DIAGNOSIS — N63.10 UNSPECIFIED LUMP IN THE RIGHT BREAST, UNSPECIFIED QUADRANT: ICD-10-CM

## 2025-08-18 DIAGNOSIS — N60.01 SOLITARY CYST OF RIGHT BREAST: ICD-10-CM

## 2025-08-18 DIAGNOSIS — N63.20 UNSPECIFIED LUMP IN THE LEFT BREAST, UNSPECIFIED QUADRANT: ICD-10-CM

## 2025-08-18 DIAGNOSIS — R92.30 DENSE BREASTS, UNSPECIFIED: ICD-10-CM

## 2025-08-18 PROCEDURE — 99214 OFFICE O/P EST MOD 30 MIN: CPT

## 2025-09-09 ENCOUNTER — APPOINTMENT (OUTPATIENT)
Dept: ORTHOPEDIC SURGERY | Facility: CLINIC | Age: 45
End: 2025-09-09